# Patient Record
Sex: FEMALE
[De-identification: names, ages, dates, MRNs, and addresses within clinical notes are randomized per-mention and may not be internally consistent; named-entity substitution may affect disease eponyms.]

---

## 2022-01-06 ENCOUNTER — HOSPITAL ENCOUNTER (OUTPATIENT)
Dept: HOSPITAL 90 - RAH | Age: 42
Discharge: HOME | End: 2022-01-06
Attending: FAMILY MEDICINE
Payer: COMMERCIAL

## 2022-01-06 DIAGNOSIS — M25.762: ICD-10-CM

## 2022-01-06 DIAGNOSIS — S70.12XA: Primary | ICD-10-CM
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
## 2022-01-06 DIAGNOSIS — S80.12XA: ICD-10-CM
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
## 2022-01-06 DIAGNOSIS — M17.12: ICD-10-CM

## 2022-01-06 DIAGNOSIS — M25.462: ICD-10-CM

## 2022-01-06 PROCEDURE — 73723 MRI JOINT LWR EXTR W/O&W/DYE: CPT

## 2024-08-28 ENCOUNTER — HOSPITAL ENCOUNTER (OUTPATIENT)
Dept: HOSPITAL 90 - ENDO | Age: 44
Discharge: HOME | End: 2024-08-28
Attending: SURGERY
Payer: COMMERCIAL

## 2024-08-28 VITALS
RESPIRATION RATE: 15 BRPM | HEART RATE: 64 BPM | DIASTOLIC BLOOD PRESSURE: 58 MMHG | SYSTOLIC BLOOD PRESSURE: 132 MMHG | TEMPERATURE: 97.1 F

## 2024-08-28 VITALS
TEMPERATURE: 97.5 F | HEART RATE: 63 BPM | RESPIRATION RATE: 17 BRPM | DIASTOLIC BLOOD PRESSURE: 80 MMHG | SYSTOLIC BLOOD PRESSURE: 140 MMHG

## 2024-08-28 VITALS
SYSTOLIC BLOOD PRESSURE: 135 MMHG | DIASTOLIC BLOOD PRESSURE: 71 MMHG | HEART RATE: 61 BPM | RESPIRATION RATE: 15 BRPM | TEMPERATURE: 97.1 F

## 2024-08-28 VITALS
DIASTOLIC BLOOD PRESSURE: 57 MMHG | RESPIRATION RATE: 15 BRPM | HEART RATE: 64 BPM | TEMPERATURE: 97.1 F | SYSTOLIC BLOOD PRESSURE: 128 MMHG

## 2024-08-28 VITALS
HEART RATE: 60 BPM | SYSTOLIC BLOOD PRESSURE: 125 MMHG | DIASTOLIC BLOOD PRESSURE: 69 MMHG | RESPIRATION RATE: 15 BRPM | TEMPERATURE: 97.7 F

## 2024-08-28 VITALS
RESPIRATION RATE: 16 BRPM | DIASTOLIC BLOOD PRESSURE: 74 MMHG | SYSTOLIC BLOOD PRESSURE: 163 MMHG | HEART RATE: 63 BPM | TEMPERATURE: 97.4 F

## 2024-08-28 VITALS
RESPIRATION RATE: 15 BRPM | DIASTOLIC BLOOD PRESSURE: 73 MMHG | SYSTOLIC BLOOD PRESSURE: 129 MMHG | HEART RATE: 62 BPM | TEMPERATURE: 97.1 F

## 2024-08-28 VITALS — WEIGHT: 243 LBS | HEIGHT: 66 IN | BODY MASS INDEX: 39.05 KG/M2

## 2024-08-28 VITALS
DIASTOLIC BLOOD PRESSURE: 72 MMHG | TEMPERATURE: 97.1 F | RESPIRATION RATE: 15 BRPM | HEART RATE: 60 BPM | SYSTOLIC BLOOD PRESSURE: 130 MMHG

## 2024-08-28 VITALS
DIASTOLIC BLOOD PRESSURE: 54 MMHG | SYSTOLIC BLOOD PRESSURE: 130 MMHG | TEMPERATURE: 97.1 F | HEART RATE: 72 BPM | RESPIRATION RATE: 15 BRPM

## 2024-08-28 DIAGNOSIS — E66.01: ICD-10-CM

## 2024-08-28 DIAGNOSIS — K44.9: ICD-10-CM

## 2024-08-28 DIAGNOSIS — M19.90: ICD-10-CM

## 2024-08-28 DIAGNOSIS — E78.5: ICD-10-CM

## 2024-08-28 DIAGNOSIS — K22.89: ICD-10-CM

## 2024-08-28 DIAGNOSIS — Z98.890: ICD-10-CM

## 2024-08-28 DIAGNOSIS — R63.4: Primary | ICD-10-CM

## 2024-08-28 DIAGNOSIS — K20.90: ICD-10-CM

## 2024-08-28 DIAGNOSIS — F41.9: ICD-10-CM

## 2024-08-28 DIAGNOSIS — Z98.891: ICD-10-CM

## 2024-08-28 DIAGNOSIS — Z79.899: ICD-10-CM

## 2024-08-28 DIAGNOSIS — K31.89: ICD-10-CM

## 2024-08-28 DIAGNOSIS — I10: ICD-10-CM

## 2024-08-28 PROCEDURE — 82948 REAGENT STRIP/BLOOD GLUCOSE: CPT

## 2024-08-28 PROCEDURE — A4606 OXYGEN PROBE USED W OXIMETER: HCPCS

## 2024-08-28 PROCEDURE — A4620 VARIABLE CONCENTRATION MASK: HCPCS

## 2024-08-28 PROCEDURE — A4221 SUPP NON-INSULIN INF CATH/WK: HCPCS

## 2024-08-28 PROCEDURE — A4215 STERILE NEEDLE: HCPCS

## 2024-08-28 PROCEDURE — A4222 INFUSION SUPPLIES WITH PUMP: HCPCS

## 2024-08-28 PROCEDURE — A4223 INFUSION SUPPLIES W/O PUMP: HCPCS

## 2024-08-28 PROCEDURE — 81025 URINE PREGNANCY TEST: CPT

## 2024-08-28 PROCEDURE — A4663 DIALYSIS BLOOD PRESSURE CUFF: HCPCS

## 2024-08-28 PROCEDURE — 43239 EGD BIOPSY SINGLE/MULTIPLE: CPT

## 2024-08-28 RX ADMIN — SODIUM CHLORIDE ONE MLS/HR: 0.9 INJECTION, SOLUTION INTRAVENOUS at 09:35

## 2024-10-10 ENCOUNTER — HOSPITAL ENCOUNTER (OUTPATIENT)
Dept: HOSPITAL 90 - LAB | Age: 44
Discharge: HOME | End: 2024-10-10
Attending: INTERNAL MEDICINE
Payer: COMMERCIAL

## 2024-10-10 DIAGNOSIS — R07.9: Primary | ICD-10-CM

## 2024-10-10 LAB
BUN SERPL-MCNC: 4 MG/DL (ref 7–18)
CHLORIDE SERPL-SCNC: 101 MMOL/L (ref 101–111)
CO2 SERPL-SCNC: 27 MMOL/L (ref 21–32)
CREAT SERPL-MCNC: 0.7 MG/DL (ref 0.5–1)
GFR SERPL CREATININE-BSD FRML MDRD: 109 ML/MIN (ref 90–?)
GLUCOSE SERPL-MCNC: 114 MG/DL (ref 70–105)
POTASSIUM SERPL-SCNC: 3.8 MMOL/L (ref 3.5–5.1)
SODIUM SERPL-SCNC: 135 MMOL/L (ref 136–145)

## 2024-10-10 PROCEDURE — 80048 BASIC METABOLIC PNL TOTAL CA: CPT

## 2024-10-10 PROCEDURE — 36415 COLL VENOUS BLD VENIPUNCTURE: CPT

## 2024-10-17 ENCOUNTER — HOSPITAL ENCOUNTER (OUTPATIENT)
Dept: HOSPITAL 90 - RAH | Age: 44
Discharge: HOME | End: 2024-10-17
Attending: INTERNAL MEDICINE
Payer: COMMERCIAL

## 2024-10-17 DIAGNOSIS — R07.9: Primary | ICD-10-CM

## 2024-10-17 PROCEDURE — 75574 CT ANGIO HRT W/3D IMAGE: CPT

## 2024-10-23 ENCOUNTER — HOSPITAL ENCOUNTER (OUTPATIENT)
Dept: HOSPITAL 90 - SHCH | Age: 44
Discharge: HOME | End: 2024-10-23
Attending: INTERNAL MEDICINE
Payer: COMMERCIAL

## 2024-10-23 DIAGNOSIS — R07.9: Primary | ICD-10-CM

## 2024-10-23 PROCEDURE — 93306 TTE W/DOPPLER COMPLETE: CPT

## 2024-12-18 VITALS
SYSTOLIC BLOOD PRESSURE: 151 MMHG | RESPIRATION RATE: 14 BRPM | HEART RATE: 70 BPM | TEMPERATURE: 97.3 F | DIASTOLIC BLOOD PRESSURE: 69 MMHG

## 2024-12-18 LAB
ALBUMIN SERPL-MCNC: 3.7 G/DL (ref 3.5–5)
ALT SERPL-CCNC: 25 U/L (ref 12–78)
AST SERPL-CCNC: 17 U/L (ref 10–37)
BASOPHILS # BLD AUTO: 0.04 K/UL (ref 0–0.2)
BASOPHILS NFR BLD AUTO: 0.4 % (ref 0–5)
BILIRUB SERPL-MCNC: 0.5 MG/DL (ref 0.2–1)
BUN SERPL-MCNC: 9 MG/DL (ref 7–18)
CHLORIDE SERPL-SCNC: 104 MMOL/L (ref 101–111)
CO2 SERPL-SCNC: 29 MMOL/L (ref 21–32)
CREAT SERPL-MCNC: 0.8 MG/DL (ref 0.5–1)
EOSINOPHIL # BLD AUTO: 0.1 K/UL (ref 0–0.7)
EOSINOPHIL NFR BLD AUTO: 1.1 % (ref 0–8)
ERYTHROCYTE [DISTWIDTH] IN BLOOD BY AUTOMATED COUNT: 12.8 % (ref 11–15.5)
GFR SERPL CREATININE-BSD FRML MDRD: 92 ML/MIN (ref 90–?)
GLUCOSE SERPL-MCNC: 88 MG/DL (ref 70–105)
HCT VFR BLD AUTO: 43.7 % (ref 36–48)
IMM GRANULOCYTES # BLD: 0.02 K/UL (ref 0–1)
LYMPHOCYTES # SPEC AUTO: 2.2 K/UL (ref 1–4.8)
LYMPHOCYTES NFR SPEC AUTO: 24.4 % (ref 21–51)
MCH RBC QN AUTO: 31.6 PG (ref 27–33)
MCHC RBC AUTO-ENTMCNC: 33.4 G/DL (ref 32–36)
MCV RBC AUTO: 94.6 FL (ref 79–99)
MONOCYTES # BLD AUTO: 0.5 K/UL (ref 0.1–1)
MONOCYTES NFR BLD AUTO: 5.5 % (ref 3–13)
NEUTROPHILS # BLD AUTO: 6.2 K/UL (ref 1.8–7.7)
NEUTROPHILS NFR BLD AUTO: 68.4 % (ref 40–77)
NRBC BLD MANUAL-RTO: 0 % (ref 0–0.19)
PLATELET # BLD AUTO: 309 K/UL (ref 130–400)
POTASSIUM SERPL-SCNC: 4.3 MMOL/L (ref 3.5–5.1)
PROT SERPL-MCNC: 7.8 G/DL (ref 6–8.3)
RBC # BLD AUTO: 4.62 MIL/UL (ref 4–5.5)
SODIUM SERPL-SCNC: 141 MMOL/L (ref 136–145)
WBC # BLD AUTO: 9.1 K/UL (ref 4.8–10.8)

## 2024-12-19 ENCOUNTER — HOSPITAL ENCOUNTER (OUTPATIENT)
Dept: HOSPITAL 90 - DAH | Age: 44
Setting detail: OBSERVATION
LOS: 1 days | Discharge: HOME | End: 2024-12-20
Attending: SURGERY | Admitting: SURGERY
Payer: COMMERCIAL

## 2024-12-19 VITALS — HEART RATE: 79 BPM | DIASTOLIC BLOOD PRESSURE: 72 MMHG | RESPIRATION RATE: 17 BRPM | SYSTOLIC BLOOD PRESSURE: 156 MMHG

## 2024-12-19 VITALS
TEMPERATURE: 98.2 F | RESPIRATION RATE: 18 BRPM | SYSTOLIC BLOOD PRESSURE: 172 MMHG | HEART RATE: 93 BPM | DIASTOLIC BLOOD PRESSURE: 107 MMHG

## 2024-12-19 VITALS
TEMPERATURE: 98.2 F | DIASTOLIC BLOOD PRESSURE: 82 MMHG | SYSTOLIC BLOOD PRESSURE: 151 MMHG | HEART RATE: 97 BPM | RESPIRATION RATE: 18 BRPM

## 2024-12-19 VITALS
TEMPERATURE: 96.8 F | DIASTOLIC BLOOD PRESSURE: 75 MMHG | SYSTOLIC BLOOD PRESSURE: 147 MMHG | HEART RATE: 63 BPM | RESPIRATION RATE: 17 BRPM

## 2024-12-19 VITALS
RESPIRATION RATE: 15 BRPM | DIASTOLIC BLOOD PRESSURE: 67 MMHG | HEART RATE: 80 BPM | TEMPERATURE: 97.9 F | SYSTOLIC BLOOD PRESSURE: 159 MMHG

## 2024-12-19 VITALS
TEMPERATURE: 98.2 F | RESPIRATION RATE: 18 BRPM | DIASTOLIC BLOOD PRESSURE: 86 MMHG | HEART RATE: 94 BPM | SYSTOLIC BLOOD PRESSURE: 169 MMHG

## 2024-12-19 VITALS
DIASTOLIC BLOOD PRESSURE: 97 MMHG | HEART RATE: 79 BPM | RESPIRATION RATE: 18 BRPM | TEMPERATURE: 98.1 F | SYSTOLIC BLOOD PRESSURE: 174 MMHG

## 2024-12-19 VITALS — DIASTOLIC BLOOD PRESSURE: 70 MMHG | HEART RATE: 82 BPM | RESPIRATION RATE: 17 BRPM | SYSTOLIC BLOOD PRESSURE: 161 MMHG

## 2024-12-19 VITALS
SYSTOLIC BLOOD PRESSURE: 170 MMHG | TEMPERATURE: 98.2 F | RESPIRATION RATE: 18 BRPM | DIASTOLIC BLOOD PRESSURE: 91 MMHG | HEART RATE: 98 BPM

## 2024-12-19 VITALS
SYSTOLIC BLOOD PRESSURE: 151 MMHG | HEART RATE: 97 BPM | TEMPERATURE: 98.2 F | RESPIRATION RATE: 18 BRPM | OXYGEN SATURATION: 94 % | DIASTOLIC BLOOD PRESSURE: 82 MMHG

## 2024-12-19 VITALS
HEART RATE: 87 BPM | TEMPERATURE: 98.1 F | SYSTOLIC BLOOD PRESSURE: 165 MMHG | RESPIRATION RATE: 18 BRPM | DIASTOLIC BLOOD PRESSURE: 83 MMHG

## 2024-12-19 VITALS
TEMPERATURE: 97.5 F | SYSTOLIC BLOOD PRESSURE: 150 MMHG | RESPIRATION RATE: 17 BRPM | DIASTOLIC BLOOD PRESSURE: 79 MMHG | HEART RATE: 100 BPM

## 2024-12-19 VITALS
RESPIRATION RATE: 18 BRPM | HEART RATE: 94 BPM | DIASTOLIC BLOOD PRESSURE: 95 MMHG | SYSTOLIC BLOOD PRESSURE: 165 MMHG | TEMPERATURE: 98.2 F

## 2024-12-19 VITALS — HEART RATE: 92 BPM | SYSTOLIC BLOOD PRESSURE: 147 MMHG | RESPIRATION RATE: 18 BRPM | DIASTOLIC BLOOD PRESSURE: 71 MMHG

## 2024-12-19 VITALS — HEART RATE: 77 BPM | DIASTOLIC BLOOD PRESSURE: 78 MMHG | SYSTOLIC BLOOD PRESSURE: 150 MMHG | RESPIRATION RATE: 16 BRPM

## 2024-12-19 VITALS — HEART RATE: 85 BPM | RESPIRATION RATE: 16 BRPM | DIASTOLIC BLOOD PRESSURE: 66 MMHG | SYSTOLIC BLOOD PRESSURE: 158 MMHG

## 2024-12-19 VITALS
TEMPERATURE: 98.1 F | RESPIRATION RATE: 18 BRPM | SYSTOLIC BLOOD PRESSURE: 185 MMHG | HEART RATE: 78 BPM | DIASTOLIC BLOOD PRESSURE: 95 MMHG

## 2024-12-19 VITALS
SYSTOLIC BLOOD PRESSURE: 162 MMHG | RESPIRATION RATE: 18 BRPM | TEMPERATURE: 98.1 F | DIASTOLIC BLOOD PRESSURE: 86 MMHG | HEART RATE: 85 BPM

## 2024-12-19 VITALS
TEMPERATURE: 98.1 F | DIASTOLIC BLOOD PRESSURE: 83 MMHG | SYSTOLIC BLOOD PRESSURE: 151 MMHG | HEART RATE: 94 BPM | RESPIRATION RATE: 18 BRPM

## 2024-12-19 VITALS — RESPIRATION RATE: 15 BRPM | DIASTOLIC BLOOD PRESSURE: 69 MMHG | SYSTOLIC BLOOD PRESSURE: 148 MMHG | HEART RATE: 74 BPM

## 2024-12-19 VITALS — WEIGHT: 231.2 LBS | HEIGHT: 64 IN | BODY MASS INDEX: 39.47 KG/M2

## 2024-12-19 VITALS — SYSTOLIC BLOOD PRESSURE: 155 MMHG | RESPIRATION RATE: 16 BRPM | HEART RATE: 78 BPM | DIASTOLIC BLOOD PRESSURE: 75 MMHG

## 2024-12-19 VITALS
DIASTOLIC BLOOD PRESSURE: 83 MMHG | HEART RATE: 91 BPM | RESPIRATION RATE: 18 BRPM | SYSTOLIC BLOOD PRESSURE: 157 MMHG | TEMPERATURE: 98.2 F

## 2024-12-19 VITALS — SYSTOLIC BLOOD PRESSURE: 140 MMHG | DIASTOLIC BLOOD PRESSURE: 62 MMHG | RESPIRATION RATE: 17 BRPM | HEART RATE: 104 BPM

## 2024-12-19 DIAGNOSIS — E55.9: ICD-10-CM

## 2024-12-19 DIAGNOSIS — F41.9: ICD-10-CM

## 2024-12-19 DIAGNOSIS — E66.01: Primary | ICD-10-CM

## 2024-12-19 DIAGNOSIS — E87.5: ICD-10-CM

## 2024-12-19 DIAGNOSIS — Z79.899: ICD-10-CM

## 2024-12-19 DIAGNOSIS — R73.03: ICD-10-CM

## 2024-12-19 DIAGNOSIS — I10: ICD-10-CM

## 2024-12-19 DIAGNOSIS — G43.909: ICD-10-CM

## 2024-12-19 DIAGNOSIS — K44.9: ICD-10-CM

## 2024-12-19 DIAGNOSIS — M19.90: ICD-10-CM

## 2024-12-19 PROCEDURE — 97116 GAIT TRAINING THERAPY: CPT

## 2024-12-19 PROCEDURE — A4600 SLEEVE, INTER LIMB COMP DEV: HCPCS

## 2024-12-19 PROCEDURE — 86901 BLOOD TYPING SEROLOGIC RH(D): CPT

## 2024-12-19 PROCEDURE — 86850 RBC ANTIBODY SCREEN: CPT

## 2024-12-19 PROCEDURE — A4221 SUPP NON-INSULIN INF CATH/WK: HCPCS

## 2024-12-19 PROCEDURE — 88312 SPECIAL STAINS GROUP 1: CPT

## 2024-12-19 PROCEDURE — 96374 THER/PROPH/DIAG INJ IV PUSH: CPT

## 2024-12-19 PROCEDURE — A4213 20+ CC SYRINGE ONLY: HCPCS

## 2024-12-19 PROCEDURE — 88307 TISSUE EXAM BY PATHOLOGIST: CPT

## 2024-12-19 PROCEDURE — 80053 COMPREHEN METABOLIC PANEL: CPT

## 2024-12-19 PROCEDURE — 96376 TX/PRO/DX INJ SAME DRUG ADON: CPT

## 2024-12-19 PROCEDURE — 96375 TX/PRO/DX INJ NEW DRUG ADDON: CPT

## 2024-12-19 PROCEDURE — A6260 WOUND CLEANSER ANY TYPE/SIZE: HCPCS

## 2024-12-19 PROCEDURE — A4222 INFUSION SUPPLIES WITH PUMP: HCPCS

## 2024-12-19 PROCEDURE — 86900 BLOOD TYPING SEROLOGIC ABO: CPT

## 2024-12-19 PROCEDURE — 43235 EGD DIAGNOSTIC BRUSH WASH: CPT

## 2024-12-19 PROCEDURE — 43775 LAP SLEEVE GASTRECTOMY: CPT

## 2024-12-19 PROCEDURE — A4215 STERILE NEEDLE: HCPCS

## 2024-12-19 PROCEDURE — A4216 STERILE WATER/SALINE, 10 ML: HCPCS

## 2024-12-19 PROCEDURE — 36415 COLL VENOUS BLD VENIPUNCTURE: CPT

## 2024-12-19 PROCEDURE — A4223 INFUSION SUPPLIES W/O PUMP: HCPCS

## 2024-12-19 PROCEDURE — 84703 CHORIONIC GONADOTROPIN ASSAY: CPT

## 2024-12-19 PROCEDURE — G0378 HOSPITAL OBSERVATION PER HR: HCPCS

## 2024-12-19 PROCEDURE — 85025 COMPLETE CBC W/AUTO DIFF WBC: CPT

## 2024-12-19 PROCEDURE — 97161 PT EVAL LOW COMPLEX 20 MIN: CPT

## 2024-12-19 PROCEDURE — A4663 DIALYSIS BLOOD PRESSURE CUFF: HCPCS

## 2024-12-19 PROCEDURE — 96372 THER/PROPH/DIAG INJ SC/IM: CPT

## 2024-12-19 RX ADMIN — MEPERIDINE HYDROCHLORIDE ONE MG: 25 INJECTION, SOLUTION INTRAMUSCULAR; INTRAVENOUS; SUBCUTANEOUS at 17:03

## 2024-12-19 RX ADMIN — Medication SCH MLS/HR: at 17:00

## 2024-12-19 RX ADMIN — PROCHLORPERAZINE EDISYLATE PRN MG: 5 INJECTION INTRAMUSCULAR; INTRAVENOUS at 18:08

## 2024-12-19 RX ADMIN — PROMETHAZINE HYDROCHLORIDE ONE MG: 25 INJECTION INTRAMUSCULAR; INTRAVENOUS at 17:42

## 2024-12-19 RX ADMIN — Medication ONE MLS/HR: at 12:58

## 2024-12-19 NOTE — OP
Operative Note:


  DATE OF PROCEDURE: 12/19/24 





SURGEON: VALERIA STINSON MD 





ASSISTANT: [Please review operative record] 





ANESTHESIA:  [General and local]





ANESTHESIOLOGIST/CRNA:  [Please review operative record]  





PREOPERATIVE DIAGNOSIS:  [Morbid obesity and associated comorbidities including 

hyperlipidemia, hypertension.  Diaphragmatic hernia]





POSTOPERATIVE DIAGNOSIS:  [Morbid obesity and associated comorbidities including

 hyperlipidemia hypertension]





SYNOPSIS:  EGD post sleeve gastrectomy with no air leak, no obstruction, no 

active intraluminal bleeding.  Hiatal hernia repair deferred as there was no cli

nically significant hiatal hernia on visual inspection.]





PROCEDURE: [Robotic assisted laparoscopic vertical sleeve gastrectomy, 

intraoperative EGD, omentoplasty]





ESTIMATED BLOOD LOSS: [30 cc]





INDICATIONS: [Patient is a 44-year-old female with morbid obesity and associated

 comorbidities including hyperlipidemia hypertension.  Patient has failed at 

attempts to lose weight through diet exercise and medication.  Recommendation 

was given for bariatric surgery.  Patient underwent bariatric surgery process 

and after the appropriate clearances, it was decided the sleeve gastrectomy was 

best surgical approach for this patient.  There was an incidental finding of a 

small hiatal hernia on EGD.  It was discussed the possibility of fixing this 

hernia at the time of surgery if it was clinically significant at the time.  All

 risks, benefits, alternatives were discussed with the patient.  All questions 

were answered.  Patient agreed to proceed with surgical procedure of sleeve 

gastrectomy and depending on operative findings, hiatal hernia repair.]








DESCRIPTION OF PROCEDURE: [After appropriate consent was obtained, the patient 

was brought into the operating room placed in supine position on the operating 

table.  SCDs were placed, preop antibiotics were given.  Patient underwent 

induction of general anesthesia, endotracheal intubation.  Patient was prepped 

and draped in the usual sterile fashion.  Time-out was performed.


Upper endoscopy was performed by placing the adult endoscope through the mouth 

into the esophagus and into the stomach, gastric juices were sucked out.  The 

endoscope was left in the esophagus for later use.


Through a left subcostal incision, Veress needle was inserted into the 

peritoneal cavity.  Insufflation was allowed to 12 mmHg.  Through a 

supraumbilical incision, 8 mm trocar with laparoscope were inserted into the 

peritoneal cavity using [employer].  Veress needle and this vicinity were examined

 with no signs of injury.  Rest of my trocars were placed under direct 

visualization.  A Danielle liver retractor liver retractor was placed through a

 right upper quadrant incision in order to retract the left lobe of the liver 

anteriorly.


At this time patient was positioned on a 20 degree reverse Trendelenburg.  The 

Oz robot was docked.


The diaphragmatic hiatus was examined, there was no diaphragmatic defect evident

 or herniation of the stomach into the mediastinum.  Given no clinically 

significant evidence of hiatal hernia, we decided to defer the hiatal hernia 

repair at this time.


We focused on vertical sleeve gastrectomy at this time.


We mobilize the greater curvature of the stomach from the angle of his down to 3

 cm proximal to the pylorus using the vessel sealer scalpel.  We applied a blue 

load 4 cm from the pylorus parallel to the lesser curvature.  We then passed the

 flexible endoscope from the esophagus into the stomach along the lesser 

curvature of the stomach and into the pylorus.  We applied a series of white 

loads parallel to the endoscope up towards the angle of his.  The staple lines 

were examined on both sides and found to be intact.  Endoscopy with insufflation

 revealed no leaks, no active intraluminal bleeding, no obstruction.  Scope was 

then withdrawn.


And omental covering was placed over the staple edge of the stomach by suturing 

the omentum to the lesser curvature with the running 3-0 V lock absorbable 

suture.  This completed the omentoplasty.


The Oz robot was undocked at this time.  The gastric remnant was brought out 

through a right upper quadrant port site.  Final inspection revealed no bleeding

 or injury.  We closed the 12 mm port with 0 Vicryl suture through a suture 

Passer.  All instruments and trocars were removed.  The abdomen was deflated.  

The skin incisions were closed with 4-0 Monocryl suture.  Dermabond was applied 

over the incision.  The procedure was tolerated well.  Patient went to recovery 

in a good condition.]











VALERIA STINSON MD                Dec 19, 2024 17:02

## 2024-12-20 VITALS
HEART RATE: 108 BPM | TEMPERATURE: 98.5 F | SYSTOLIC BLOOD PRESSURE: 167 MMHG | DIASTOLIC BLOOD PRESSURE: 89 MMHG | RESPIRATION RATE: 19 BRPM

## 2024-12-20 VITALS
TEMPERATURE: 98.6 F | SYSTOLIC BLOOD PRESSURE: 168 MMHG | DIASTOLIC BLOOD PRESSURE: 78 MMHG | HEART RATE: 113 BPM | RESPIRATION RATE: 20 BRPM

## 2024-12-20 VITALS
OXYGEN SATURATION: 97 % | TEMPERATURE: 98.9 F | RESPIRATION RATE: 16 BRPM | DIASTOLIC BLOOD PRESSURE: 75 MMHG | SYSTOLIC BLOOD PRESSURE: 154 MMHG | HEART RATE: 102 BPM

## 2024-12-20 VITALS
DIASTOLIC BLOOD PRESSURE: 81 MMHG | HEART RATE: 63 BPM | RESPIRATION RATE: 16 BRPM | TEMPERATURE: 98.2 F | SYSTOLIC BLOOD PRESSURE: 150 MMHG

## 2024-12-20 VITALS
DIASTOLIC BLOOD PRESSURE: 84 MMHG | TEMPERATURE: 98.2 F | RESPIRATION RATE: 16 BRPM | SYSTOLIC BLOOD PRESSURE: 151 MMHG | HEART RATE: 65 BPM

## 2024-12-20 RX ADMIN — HYDROCODONE BITARTRATE AND ACETAMINOPHEN PRN ML: 7.5; 325 SOLUTION ORAL at 05:07

## 2024-12-20 RX ADMIN — ENOXAPARIN SODIUM SCH MG: 40 INJECTION SUBCUTANEOUS at 10:09

## 2024-12-20 RX ADMIN — PROPRANOLOL HYDROCHLORIDE SCH MG: 20 TABLET ORAL at 08:21

## 2024-12-20 NOTE — NUR
DCP

CM MET WITH PT ASSESSMENT DONE. PATIENT IS INDEPENDENT PRIOR TO SURGERY, LIVES AT HOME WITH 
SPOUSE. DENIES ANY EQUIPMENT/SERVICES. FEELS SAFE TO GO BACK HOME, STILL DRIVE, SPOUSE ABLE 
TO ASSIST WITH TRANSPORTATION AND NEEDS AS NECESSARY. [email or web address] IN 
[place] FOR MEDS. DCP HOME ONCE STABLE. CM TO CONTINUE TO FOLLOW UP.

-------------------------------------------------------------------------------

Addendum: 12/20/24 at 1129 by DUSTIN RYAN LVN CM

-------------------------------------------------------------------------------

Amended: Links added.

## 2024-12-20 NOTE — NUR
Nutrition consult per Pt bariatric 

Reviewed labs, notes, and medications. Pt spoke w/ bariatric coordinator for ~ 1 hr. Pt on 
phase 1 diet, sedated per chart review. Family present during visit. Pt reported 0% PO 
intake, NKFA, belching and gas, has nausea, denies emesis, has been walking, saw RD once, 
made an appointment with RD 1 month from now, no MVI, sees PCP when sick.



RD reviewed educational material for post-op diet recommendations. Pt was informed of 
importance of lifelong vitamin/mineral supplementation, choosing protein first during meals 
(pt was educated on higher protein requirements), choosing low calorie, sugar free, 
carbonated free and caffeine beverages. RD also informed pt on lifelong commitment to 
exercise and dietary recommendations for optimal success post surgery. RD encouraged getting 
blood work every 3 to 6 months, including B-vitamins, Pt verbalized understanding. RD 
informed Pt on moving around after procedure to prevent DVT, Pt verbalized understanding. 

Pt was encouraged to contact RD as questions arise and to attend support groups. Fair 
compliance suspected. 

Pt will benefit from outpatient bariatric dietitian follow up post procedure. 

Pt to follow up with PCP for labs. 



Recommendations:

-Continue phase 1 bariatric diet for 1 week 

-Monitor electrolytes

-Monitor diet tolerance

-Monitor BM 

-Monitor wts

-Monitor PO intake

-Recommend Pt to follow up with PCP

-Monitor goals of care

RD available for consult per protocol


-------------------------------------------------------------------------------

Addendum: 12/20/24 at 1311 by Katie Pettit RD

-------------------------------------------------------------------------------

Amended: Links added.

## 2024-12-20 NOTE — NUR
NOTE SUMMARY/DC

PATIENT ALERT X4

PATIENT WAS DC'D BY DR STINSON TODAY

I DC'D PATIENTS IV WITH CATH STILL IN PLACE AND APPLIED 2X2 GAUZE WITH COBAN 

I EXPLAINED TO PATIENT TO FOLLOW UP WITH DR STINSON AS SCHEDULED

PATIENT IS AMBULATING WELL, PASSING GAS, PAIN TOLERATED, AND TOLERATING DIET

I PROVIDED PATIENT WITH ABDOMINAL BINDER TO TAKE HOME

PATIENT WAS TAKEN VIA WHEELCHAIR BY NU HUSTON, NO COMPLICATIONS